# Patient Record
Sex: FEMALE | Race: WHITE
[De-identification: names, ages, dates, MRNs, and addresses within clinical notes are randomized per-mention and may not be internally consistent; named-entity substitution may affect disease eponyms.]

---

## 2020-01-13 ENCOUNTER — HOSPITAL ENCOUNTER (OUTPATIENT)
Dept: HOSPITAL 95 - ORSCMMR | Age: 35
LOS: 1 days | Discharge: HOME | End: 2020-01-14
Attending: OBSTETRICS & GYNECOLOGY
Payer: COMMERCIAL

## 2020-01-13 VITALS — WEIGHT: 161.6 LBS | HEIGHT: 64.02 IN | BODY MASS INDEX: 27.59 KG/M2

## 2020-01-13 DIAGNOSIS — N80.3: Primary | ICD-10-CM

## 2020-01-13 DIAGNOSIS — R10.2: ICD-10-CM

## 2020-01-13 DIAGNOSIS — M79.7: ICD-10-CM

## 2020-01-13 DIAGNOSIS — N83.201: ICD-10-CM

## 2020-01-13 DIAGNOSIS — F17.210: ICD-10-CM

## 2020-01-13 DIAGNOSIS — N80.0: ICD-10-CM

## 2020-01-13 PROCEDURE — 0UT2FZZ RESECTION OF BILATERAL OVARIES, VIA NATURAL OR ARTIFICIAL OPENING WITH PERCUTANEOUS ENDOSCOPIC ASSISTANCE: ICD-10-PCS | Performed by: OBSTETRICS & GYNECOLOGY

## 2020-01-13 PROCEDURE — 0UT7FZZ RESECTION OF BILATERAL FALLOPIAN TUBES, VIA NATURAL OR ARTIFICIAL OPENING WITH PERCUTANEOUS ENDOSCOPIC ASSISTANCE: ICD-10-PCS | Performed by: OBSTETRICS & GYNECOLOGY

## 2020-01-13 PROCEDURE — 0UT9FZZ RESECTION OF UTERUS, VIA NATURAL OR ARTIFICIAL OPENING WITH PERCUTANEOUS ENDOSCOPIC ASSISTANCE: ICD-10-PCS | Performed by: OBSTETRICS & GYNECOLOGY

## 2020-01-13 NOTE — NUR
shift summary: pt remained a/o x 4, pleasant/cooperative, post op vss and
complete. pt tolerates minimal PO intake of food, drinking fluids with no n/v.
after attemptin dairy, pt reported some nausea, no vomiting. pt tolerated PO
analgesia per mar. pt ambulated in hallways x 2. calzada catheter
patent/draining clear yellow urine. scant vaginal drainage on gilberto pad. lungs
clear. laparascopic sites x 3 c/d/i, no drainage

## 2020-01-13 NOTE — NUR
Ambulatory in Day Surgery
Surgical site prepped with 2% Chlorhexidine cloth wipe.
History, Chart, Medications and Allergies reviewed before start of
procedure.Lungs clear T/O to Auscultation.
Patient confirms NPO status and agrees with scheduled surgery.
Patient States Post-Procedure ride home has been arranged.

## 2020-01-14 LAB
BASOPHILS # BLD AUTO: 0.01 K/MM3 (ref 0–0.23)
BASOPHILS NFR BLD AUTO: 0 % (ref 0–2)
DEPRECATED RDW RBC AUTO: 41.9 FL (ref 35.1–46.3)
EOSINOPHIL # BLD AUTO: 0.01 K/MM3 (ref 0–0.68)
EOSINOPHIL NFR BLD AUTO: 0 % (ref 0–6)
ERYTHROCYTE [DISTWIDTH] IN BLOOD BY AUTOMATED COUNT: 13.3 % (ref 11.7–14.2)
HCT VFR BLD AUTO: 37.4 % (ref 33–51)
HGB BLD-MCNC: 12 G/DL (ref 11.5–16)
IMM GRANULOCYTES # BLD AUTO: 0.01 K/MM3 (ref 0–0.1)
IMM GRANULOCYTES NFR BLD AUTO: 0 % (ref 0–1)
LYMPHOCYTES # BLD AUTO: 0.62 K/MM3 (ref 0.84–5.2)
LYMPHOCYTES NFR BLD AUTO: 9 % (ref 21–46)
MCHC RBC AUTO-ENTMCNC: 32.1 G/DL (ref 31.5–36.5)
MCV RBC AUTO: 86 FL (ref 80–100)
MONOCYTES # BLD AUTO: 0.77 K/MM3 (ref 0.16–1.47)
MONOCYTES NFR BLD AUTO: 11 % (ref 4–13)
NEUTROPHILS # BLD AUTO: 5.35 K/MM3 (ref 1.96–9.15)
NEUTROPHILS NFR BLD AUTO: 79 % (ref 41–73)
NRBC # BLD AUTO: 0 K/MM3 (ref 0–0.02)
NRBC BLD AUTO-RTO: 0 /100 WBC (ref 0–0.2)
PLATELET # BLD AUTO: 200 K/MM3 (ref 150–400)

## 2020-01-14 NOTE — NUR
DISCHARGE
PT DISCHARGED HOME FROM UNIT AT APROX 1100. PT GIVEN WRITTEN AND VERBAL
DISCHARGE INSTRUCTIONS AND VERBALIZED UNDERSTANDING OF THESE INSTRUCTIONS. IV
REMOVED, PT TOLERATED WELL. WHEELCHAIR ASSISTANCE TO CAR.

## 2020-01-14 NOTE — NUR
SHIFT SUMMARY
PATIENT HAS HAD AN UNEVENTFUL NIGHT. SHE WAS UP IN THE [place] WALKING LOOPS WITH
HER . SHE WAS EXPERIENCING 6-8/10 ABDOMINAL AND VAGINAL PAIN, MEDICATED
PER EM[facility]. tHIS MORNING SHE HAS BEEN PASSING GAS AND HER PAIN IS UNDER CONTROL.
SCANT VAGINAL BLEEDING, ABDOMINAL LAP SITES ARE CLEAN AND DRY. TORRES CATH IS
DRAINING CLEAR YELLOW URINE. NO OTHER ACUTE CHANGES.

## 2021-04-15 ENCOUNTER — HOSPITAL ENCOUNTER (EMERGENCY)
Dept: HOSPITAL 95 - ER | Age: 36
Discharge: HOME | End: 2021-04-15
Payer: COMMERCIAL

## 2021-04-15 VITALS — WEIGHT: 145 LBS | BODY MASS INDEX: 24.75 KG/M2 | HEIGHT: 64 IN

## 2021-04-15 DIAGNOSIS — L02.213: Primary | ICD-10-CM

## 2021-04-15 PROCEDURE — A9270 NON-COVERED ITEM OR SERVICE: HCPCS

## 2023-04-09 ENCOUNTER — HOSPITAL ENCOUNTER (EMERGENCY)
Dept: HOSPITAL 95 - ER | Age: 38
LOS: 1 days | Discharge: HOME | End: 2023-04-10
Payer: COMMERCIAL

## 2023-04-09 VITALS — WEIGHT: 155.01 LBS | BODY MASS INDEX: 26.46 KG/M2 | HEIGHT: 64 IN

## 2023-04-09 DIAGNOSIS — R07.89: Primary | ICD-10-CM

## 2023-04-09 DIAGNOSIS — F17.290: ICD-10-CM

## 2023-04-09 PROCEDURE — A9270 NON-COVERED ITEM OR SERVICE: HCPCS
